# Patient Record
Sex: MALE | Employment: FULL TIME | URBAN - METROPOLITAN AREA
[De-identification: names, ages, dates, MRNs, and addresses within clinical notes are randomized per-mention and may not be internally consistent; named-entity substitution may affect disease eponyms.]

---
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
## 2024-10-29 ENCOUNTER — OFFICE VISIT (OUTPATIENT)
Dept: PEDIATRICS | Facility: CLINIC | Age: 19
End: 2024-10-29
Payer: COMMERCIAL

## 2024-10-29 VITALS
TEMPERATURE: 99.7 F | WEIGHT: 131 LBS | HEIGHT: 63 IN | DIASTOLIC BLOOD PRESSURE: 82 MMHG | HEART RATE: 108 BPM | BODY MASS INDEX: 23.21 KG/M2 | SYSTOLIC BLOOD PRESSURE: 122 MMHG

## 2024-10-29 DIAGNOSIS — R05.9 COUGH, UNSPECIFIED TYPE: Primary | ICD-10-CM

## 2024-10-29 PROCEDURE — 99214 OFFICE O/P EST MOD 30 MIN: CPT | Performed by: PEDIATRICS

## 2024-10-29 PROCEDURE — 3008F BODY MASS INDEX DOCD: CPT | Performed by: PEDIATRICS

## 2024-10-29 RX ORDER — AZITHROMYCIN 250 MG/1
TABLET, FILM COATED ORAL
Qty: 6 TABLET | Refills: 0 | Status: SHIPPED | OUTPATIENT
Start: 2024-10-29 | End: 2024-11-02

## 2024-12-09 ENCOUNTER — OFFICE VISIT (OUTPATIENT)
Dept: PEDIATRICS | Facility: CLINIC | Age: 19
End: 2024-12-09
Payer: COMMERCIAL

## 2024-12-09 VITALS
HEART RATE: 112 BPM | HEIGHT: 63 IN | WEIGHT: 129 LBS | TEMPERATURE: 99 F | BODY MASS INDEX: 22.86 KG/M2 | DIASTOLIC BLOOD PRESSURE: 81 MMHG | SYSTOLIC BLOOD PRESSURE: 122 MMHG

## 2024-12-09 DIAGNOSIS — J01.10 ACUTE NON-RECURRENT FRONTAL SINUSITIS: Primary | ICD-10-CM

## 2024-12-09 PROCEDURE — 1036F TOBACCO NON-USER: CPT | Performed by: PEDIATRICS

## 2024-12-09 PROCEDURE — 99213 OFFICE O/P EST LOW 20 MIN: CPT | Performed by: PEDIATRICS

## 2024-12-09 PROCEDURE — 3008F BODY MASS INDEX DOCD: CPT | Performed by: PEDIATRICS

## 2024-12-09 RX ORDER — CEFDINIR 300 MG/1
300 CAPSULE ORAL 2 TIMES DAILY
Qty: 20 CAPSULE | Refills: 0 | Status: SHIPPED | OUTPATIENT
Start: 2024-12-09 | End: 2024-12-19

## 2024-12-09 NOTE — PROGRESS NOTES
"Subjective   Christian Hendricks a 19 y.o.malewho presents forConjunctivitis (Left eye red - not itchy - noticed yesterday, woke up with drainage from that eys) and Nasal Congestion (19 yr old, unaccompanied - facial pressure/sinus congestion, bloody nasal drainage and slight cough - tried dayquil/nyquil )  HPI    Sinus congestion, eye red and sick for about a week- brother with cold  Using dayquil and it did help but now isn't  Feels pressure and pain  No fevers.   Bloody nasal discharge    Objective   /81 (BP Location: Left arm, BP Cuff Size: Adult)   Pulse (!) 112   Temp 37.2 °C (99 °F) (Oral)   Ht 1.588 m (5' 2.5\")   Wt 58.5 kg (129 lb)   BMI 23.22 kg/m²       Physical Exam      General: Well-developed, well-nourished, alert and oriented, no acute distress  Eyes: Normal sclera except left injected, PERRLA, EOMI  ENT: Moderate purulent nasal discharge with sinus tenderness, mildly red throat but not beefy, no petechiae, ears are clear except pus behind left tm.  Cardiac: Regular rate and rhythm, normal S1/S2, no murmurs.  Pulmonary: Clear to auscultation bilaterally, no work of breathing.  GI: Soft nondistended nontender abdomen without rebound or guarding.  Skin: No rashes  Lymph: No lymphadenopathy        No visits with results within 10 Day(s) from this visit.   Latest known visit with results is:   [facility] Encounter on 09/29/2021   Component Date Value Ref Range Status    SARS-CoV-2 Result 09/29/2021 NOT DETECTED  Not Detected Final         Assessment/Plan   Diagnoses and all orders for this visit:  Acute non-recurrent frontal sinusitis  -     cefdinir (Omnicef) 300 mg capsule; Take 1 capsule (300 mg) by mouth 2 times a day for 10 days.    "